# Patient Record
Sex: MALE | Race: WHITE | Employment: OTHER | URBAN - NONMETROPOLITAN AREA
[De-identification: names, ages, dates, MRNs, and addresses within clinical notes are randomized per-mention and may not be internally consistent; named-entity substitution may affect disease eponyms.]

---

## 2018-08-14 ENCOUNTER — HOSPITAL ENCOUNTER (EMERGENCY)
Age: 23
Discharge: HOME | End: 2018-08-14
Payer: COMMERCIAL

## 2018-08-14 VITALS
HEART RATE: 86 BPM | OXYGEN SATURATION: 96 % | WEIGHT: 175 LBS | TEMPERATURE: 99.1 F | SYSTOLIC BLOOD PRESSURE: 130 MMHG | DIASTOLIC BLOOD PRESSURE: 63 MMHG | RESPIRATION RATE: 18 BRPM

## 2018-08-14 DIAGNOSIS — R07.89 CHEST WALL PAIN: Primary | ICD-10-CM

## 2018-08-14 LAB
EKG ATRIAL RATE: 65 BPM
EKG P AXIS: 52 DEGREES
EKG P-R INTERVAL: 136 MS
EKG Q-T INTERVAL: 374 MS
EKG QRS DURATION: 92 MS
EKG QTC CALCULATION (BAZETT): 388 MS
EKG R AXIS: 42 DEGREES
EKG T AXIS: 63 DEGREES
EKG VENTRICULAR RATE: 65 BPM

## 2018-08-14 PROCEDURE — 99205 OFFICE O/P NEW HI 60 MIN: CPT

## 2018-08-14 PROCEDURE — 93010 ELECTROCARDIOGRAM REPORT: CPT | Performed by: NUCLEAR MEDICINE

## 2018-08-14 PROCEDURE — 93005 ELECTROCARDIOGRAM TRACING: CPT | Performed by: NURSE PRACTITIONER

## 2018-08-14 PROCEDURE — 99202 OFFICE O/P NEW SF 15 MIN: CPT | Performed by: NURSE PRACTITIONER

## 2018-08-14 RX ORDER — ALBUTEROL SULFATE 90 UG/1
2 AEROSOL, METERED RESPIRATORY (INHALATION) EVERY 6 HOURS PRN
COMMUNITY

## 2018-08-14 RX ORDER — PREDNISONE 50 MG/1
50 TABLET ORAL DAILY
Qty: 7 TABLET | Refills: 0 | Status: SHIPPED | OUTPATIENT
Start: 2018-08-14 | End: 2018-08-21

## 2018-08-14 ASSESSMENT — ENCOUNTER SYMPTOMS
COUGH: 0
SHORTNESS OF BREATH: 1
ABDOMINAL PAIN: 0
STRIDOR: 0
CHEST TIGHTNESS: 0
SINUS PAIN: 0
TROUBLE SWALLOWING: 0

## 2018-08-14 NOTE — ED TRIAGE NOTES
Pt walked to room 2. Pt here with complaints of chest pain. Chest pain 2 months ago. Sometimes after he ate. Yesterday pt had shortness of breath when having chest pain. Woke up this morning and was taking a shower and started with chest pain and had a sharp pain in center of back and also sob. Pt states has no cough or congestion.

## 2018-08-14 NOTE — ED PROVIDER NOTES
[id number]  Urgent Care Encounter       CHIEF COMPLAINT       Chief Complaint   Patient presents with    Chest Pain     Chest pain x's 2 months. Pt was taking shower this morning and had chest pain and sharp shoulder pain, left arm went numb. Nurses Notes reviewed and I agree except as noted in the HPI. HISTORY OF PRESENT ILLNESS   Shanice Manrique is a 26 y.o. male who presents For evaluation of chest pain for the past 2 months. Patient reports that the symptoms will begin intermittently, and then dissipate. Patient reports that yesterday he was out in his yard when he began to have the chest pain and became short of breath as well. He reports that he has a history of asthma; he believes that it could be related to this. He states that this morning in the shower he began having the symptoms well and caused pain into his left arm. Patient states he is asymptomatic at this time. He reports that sometimes the pain and symptoms can be traced to when he eats, however other times they're not. He denies any cough, congestion, sore throat or any other concerning symptoms. He denies any history of smoking, history of blood clots, recent trauma or surgery, previous cancers, or any other concerning medical history. The history is provided by the patient. REVIEW OF SYSTEMS     Review of Systems   Constitutional: Negative for chills, fatigue and fever. HENT: Negative for congestion, sinus pain and trouble swallowing. Respiratory: Positive for shortness of breath. Negative for cough, chest tightness and stridor. Cardiovascular: Positive for chest pain. Negative for palpitations. Gastrointestinal: Negative for abdominal pain. Musculoskeletal: Negative for arthralgias and myalgias. Skin: Negative for rash. PAST MEDICAL HISTORY   History reviewed. No pertinent past medical history. SURGICAL HISTORY     Patient  has no past surgical history on file.     CURRENT MEDICATIONS       Previous Medications    ALBUTEROL SULFATE  (90 BASE) MCG/ACT INHALER    Inhale 2 puffs into the lungs every 6 hours as needed for Wheezing    FEXOFENADINE-PSEUDOEPHEDRINE (ALLEGRA-D PO)    Take by mouth       ALLERGIES     Patient is has No Known Allergies. Patients   There is no immunization history on file for this patient. FAMILY HISTORY     Patient's family history is not on file. SOCIAL HISTORY     Patient  reports that he has been smoking Cigars. His smokeless tobacco use includes Chew. He reports that he drinks alcohol. He reports that he does not use drugs. PHYSICAL EXAM     ED TRIAGE VITALS  BP: 130/63, Temp: 99.1 °F (37.3 °C), Pulse: 86, Resp: 18, SpO2: 96 %,There is no height or weight on file to calculate BMI.,No LMP for male patient. Physical Exam   Constitutional: He is oriented to person, place, and time. He appears well-developed and well-nourished. No distress. Cardiovascular: Normal rate, regular rhythm and normal heart sounds. Pulmonary/Chest: Effort normal and breath sounds normal. No respiratory distress. He has no wheezes. He has no rhonchi. He exhibits no bony tenderness and no retraction. Neurological: He is alert and oriented to person, place, and time. Skin: Skin is warm and dry. No rash noted. He is not diaphoretic. Psychiatric: He has a normal mood and affect. Nursing note and vitals reviewed. DIAGNOSTIC RESULTS     Labs:No results found for this visit on 08/14/18. IMAGING:    No orders to display         EKG: EKG: sinus arrhythmia, rate of 65 . URGENT CARE COURSE:     Vitals:    08/14/18 1508   BP: 130/63   Pulse: 86   Resp: 18   Temp: 99.1 °F (37.3 °C)   TempSrc: Temporal   SpO2: 96%   Weight: 175 lb (79.4 kg)       Medications - No data to display         PROCEDURES:  None    FINAL IMPRESSION      1.  Chest wall pain          DISPOSITION/PLAN     I do not suspect PE per PERC criteria:   Age less than 50   Pulse ox greater than or equal to 96 on room air   Heart rate less than 100   No prior venous embolism   No recent surgery or trauma requiring hospitalization within the prior 4 weeks   No hemoptysis   No estrogen use   No unilateral leg swelling      Discussed with the patient that if the symptoms persist that he should present to record the emergency department. Being that he is asymptomatic at this time I'm comfortable discharging the patient with a prescription for prednisone to treat that this is likely either chest wall or pleurisy pain. It is a low risk for PE and based on his vital signs I feel comfortable discharging him at this time. He is agreeable to follow-up as discussed. PATIENT REFERRED TO:  No primary care provider on file. No primary physician on file.       DISCHARGE MEDICATIONS:  New Prescriptions    PREDNISONE (DELTASONE) 50 MG TABLET    Take 1 tablet by mouth daily for 7 days       Discontinued Medications    No medications on file       Current Discharge Medication List          ROBERT Hutson CNP    (Please note that portions of this note were completed with a voice recognition program.  Efforts were made to edit the dictations but occasionally words are mis-transcribed.)           ROBERT Hutson CNP  08/14/18 1410